# Patient Record
Sex: MALE | Race: WHITE
[De-identification: names, ages, dates, MRNs, and addresses within clinical notes are randomized per-mention and may not be internally consistent; named-entity substitution may affect disease eponyms.]

---

## 2018-04-07 ENCOUNTER — HOSPITAL ENCOUNTER (INPATIENT)
Dept: HOSPITAL 63 - ER | Age: 18
LOS: 1 days | Discharge: HOME | DRG: 897 | End: 2018-04-08
Attending: INTERNAL MEDICINE | Admitting: INTERNAL MEDICINE
Payer: SELF-PAY

## 2018-04-07 VITALS — HEIGHT: 73 IN | WEIGHT: 151.06 LBS | BODY MASS INDEX: 20.02 KG/M2

## 2018-04-07 VITALS — DIASTOLIC BLOOD PRESSURE: 54 MMHG | SYSTOLIC BLOOD PRESSURE: 90 MMHG

## 2018-04-07 VITALS — SYSTOLIC BLOOD PRESSURE: 108 MMHG | DIASTOLIC BLOOD PRESSURE: 69 MMHG

## 2018-04-07 DIAGNOSIS — Z79.899: ICD-10-CM

## 2018-04-07 DIAGNOSIS — F14.10: ICD-10-CM

## 2018-04-07 DIAGNOSIS — E87.2: ICD-10-CM

## 2018-04-07 DIAGNOSIS — F12.10: Primary | ICD-10-CM

## 2018-04-07 DIAGNOSIS — D72.828: ICD-10-CM

## 2018-04-07 DIAGNOSIS — N30.00: ICD-10-CM

## 2018-04-07 DIAGNOSIS — F32.9: ICD-10-CM

## 2018-04-07 DIAGNOSIS — F17.210: ICD-10-CM

## 2018-04-07 DIAGNOSIS — J45.909: ICD-10-CM

## 2018-04-07 DIAGNOSIS — E86.0: ICD-10-CM

## 2018-04-07 DIAGNOSIS — N30.20: ICD-10-CM

## 2018-04-07 LAB
% ATYL: 1 % (ref 0–0)
% LYMPHS: 9 % (ref 24–48)
% MONOS: 8 % (ref 0–10)
% SEGS: 81 % (ref 35–66)
ACETAMIN: < 2 MCG/ML (ref 10–30)
ALBUMIN SERPL-MCNC: 4.9 G/DL (ref 3.4–5)
ALP SERPL-CCNC: 120 U/L (ref 46–116)
ALT SERPL-CCNC: 31 U/L (ref 16–63)
AMORPH SED URNS QL MICRO: PRESENT /HPF
AMPHETAMINE/METHAMPHETAMINE: (no result)
ANION GAP SERPL CALC-SCNC: 16 MMOL/L (ref 6–14)
APTT PPP: YELLOW S
AST SERPL-CCNC: 33 U/L (ref 15–37)
BACTERIA #/AREA URNS HPF: 0 /HPF
BARBITURATES UR-MCNC: (no result) UG/ML
BASOPHILS # BLD AUTO: 0.1 X10^3/UL (ref 0–0.2)
BASOPHILS NFR BLD AUTO: 1 % (ref 0–3)
BASOPHILS NFR BLD: 0 % (ref 0–3)
BENZODIAZ UR-MCNC: (no result) UG/L
BGAS PCO2: 33 MMHG (ref 35–46)
BGAS PH: 7.42 (ref 7.35–7.46)
BGAS PO2: 76 MMHG (ref 80–100)
BILIRUB DIRECT SERPL-MCNC: 0.1 MG/DL (ref 0–0.2)
BILIRUB SERPL-MCNC: 0.5 MG/DL (ref 0.2–1)
BILIRUB UR QL STRIP: (no result)
BUN ISTAT: 13 MG/DL (ref 8–26)
CA-I SERPL ISE-MCNC: 13 MG/DL (ref 8–26)
CALCIUM SERPL-MCNC: 9.9 MG/DL (ref 8.5–10.1)
CANNABINOIDS UR-MCNC: (no result) UG/L
CHLORIDE SERPL-SCNC: 101 MMOL/L (ref 98–107)
CO2 SERPL-SCNC: 23 MMOL/L (ref 21–32)
COCAINE UR-MCNC: (no result) NG/ML
CREAT SERPL-MCNC: 1 MG/DL (ref 0.7–1.3)
DELTA BASE BGAS: -3 MMOL/L (ref 0–3)
EOSINOPHIL NFR BLD: 0 % (ref 0–3)
EOSINOPHIL NFR BLD: 0.1 X10^3/UL (ref 0–0.7)
ERYTHROCYTE [DISTWIDTH] IN BLOOD BY AUTOMATED COUNT: 13.4 % (ref 11.5–14.5)
ETHANOL SERPL-MCNC: < 10 MG/DL (ref 0–10)
FIBRINOGEN PPP-MCNC: (no result) MG/DL
GFR SERPLBLD BASED ON 1.73 SQ M-ARVRAT: 96.3 ML/MIN
GLUCOSE BLD-MCNC: 125 MG/DL (ref 60–99)
GLUCOSE SERPL-MCNC: 99 MG/DL (ref 70–99)
GLUCOSE UR STRIP-MCNC: (no result) MG/DL
HCT VFR BLD AUTO: 50 %
HCT VFR BLD CALC: 52.7 % (ref 39–53)
HEMOGLOBIN ISTAT: 17 GM/DL
HGB BLD-MCNC: 18 G/DL (ref 13–17.5)
LIPASE: 110 U/L (ref 73–393)
LYMPHOCYTES # BLD: 2.3 X10^3/UL (ref 1–4.8)
LYMPHOCYTES NFR BLD AUTO: 10 % (ref 24–48)
MAGNESIUM SERPL-MCNC: 2.2 MG/DL (ref 1.8–2.4)
MCH RBC QN AUTO: 31 PG (ref 25–35)
MCHC RBC AUTO-ENTMCNC: 34 G/DL (ref 31–37)
MCV RBC AUTO: 90 FL (ref 79–100)
METHADONE SERPL-MCNC: (no result) NG/ML
MONO #: 1.6 X10^3/UL (ref 0–1.1)
MONOCYTES NFR BLD: 7 % (ref 0–9)
NEUT #: 18.6 X10^3UL (ref 1.8–7.7)
NEUTROPHILS NFR BLD AUTO: 82 % (ref 31–73)
NITRITE UR QL STRIP: (no result)
O2 SAT BGAS: 96 % (ref 92–99)
O2/TOTAL GAS SETTING VFR VENT: 21 %
OPIATES UR-MCNC: (no result) NG/ML
PCP SERPL-MCNC: (no result) MG/DL
PLATELET # BLD AUTO: 301 X10^3/UL (ref 140–400)
PLATELET # BLD EST: ADEQUATE 10*3/UL
POTASSIUM BLD-SCNC: 3.4 MMOL/L (ref 3.5–5)
POTASSIUM SERPL-SCNC: 4 MMOL/L (ref 3.5–5.1)
PROT SERPL-MCNC: 8.9 G/DL (ref 6.4–8.2)
RBC # BLD AUTO: 5.89 X10^6/UL (ref 4.3–5.7)
RBC #/AREA URNS HPF: (no result) /HPF (ref 0–2)
SALIC: 4.4 MG/DL (ref 2.8–20)
SODIUM SERPL-SCNC: 139 MMOL/L (ref 135–145)
SODIUM SERPL-SCNC: 140 MMOL/L (ref 136–145)
SP GR UR STRIP: 1.01
SQUAMOUS #/AREA URNS LPF: (no result) /LPF
UROBILINOGEN UR-MCNC: 1 MG/DL
WBC # BLD AUTO: 22.6 X10^3/UL (ref 4–11)
WBC #/AREA URNS HPF: (no result) /HPF (ref 0–4)

## 2018-04-07 PROCEDURE — 87591 N.GONORRHOEAE DNA AMP PROB: CPT

## 2018-04-07 PROCEDURE — 74177 CT ABD & PELVIS W/CONTRAST: CPT

## 2018-04-07 PROCEDURE — 85007 BL SMEAR W/DIFF WBC COUNT: CPT

## 2018-04-07 PROCEDURE — 96361 HYDRATE IV INFUSION ADD-ON: CPT

## 2018-04-07 PROCEDURE — 80053 COMPREHEN METABOLIC PANEL: CPT

## 2018-04-07 PROCEDURE — 82803 BLOOD GASES ANY COMBINATION: CPT

## 2018-04-07 PROCEDURE — G0480 DRUG TEST DEF 1-7 CLASSES: HCPCS

## 2018-04-07 PROCEDURE — 82553 CREATINE MB FRACTION: CPT

## 2018-04-07 PROCEDURE — 80076 HEPATIC FUNCTION PANEL: CPT

## 2018-04-07 PROCEDURE — 87491 CHLMYD TRACH DNA AMP PROBE: CPT

## 2018-04-07 PROCEDURE — G0479 DRUG TEST PRESUMP NOT OPT: HCPCS

## 2018-04-07 PROCEDURE — 85610 PROTHROMBIN TIME: CPT

## 2018-04-07 PROCEDURE — 96374 THER/PROPH/DIAG INJ IV PUSH: CPT

## 2018-04-07 PROCEDURE — 87040 BLOOD CULTURE FOR BACTERIA: CPT

## 2018-04-07 PROCEDURE — 96375 TX/PRO/DX INJ NEW DRUG ADDON: CPT

## 2018-04-07 PROCEDURE — 87086 URINE CULTURE/COLONY COUNT: CPT

## 2018-04-07 PROCEDURE — 85027 COMPLETE CBC AUTOMATED: CPT

## 2018-04-07 PROCEDURE — 82550 ASSAY OF CK (CPK): CPT

## 2018-04-07 PROCEDURE — 83735 ASSAY OF MAGNESIUM: CPT

## 2018-04-07 PROCEDURE — 80307 DRUG TEST PRSMV CHEM ANLYZR: CPT

## 2018-04-07 PROCEDURE — 80047 BASIC METABLC PNL IONIZED CA: CPT

## 2018-04-07 PROCEDURE — 36415 COLL VENOUS BLD VENIPUNCTURE: CPT

## 2018-04-07 PROCEDURE — 81001 URINALYSIS AUTO W/SCOPE: CPT

## 2018-04-07 PROCEDURE — 85025 COMPLETE CBC W/AUTO DIFF WBC: CPT

## 2018-04-07 PROCEDURE — 80048 BASIC METABOLIC PNL TOTAL CA: CPT

## 2018-04-07 PROCEDURE — 83605 ASSAY OF LACTIC ACID: CPT

## 2018-04-07 PROCEDURE — 85730 THROMBOPLASTIN TIME PARTIAL: CPT

## 2018-04-07 PROCEDURE — 83690 ASSAY OF LIPASE: CPT

## 2018-04-07 RX ADMIN — SODIUM CHLORIDE SCH MLS/HR: 0.9 INJECTION, SOLUTION INTRAVENOUS at 18:45

## 2018-04-07 NOTE — PHYS DOC
Adult General


Chief Complaint


Chief Complaint:  NAUSEA/VOMITING/DIARRHEA





HPI


HPI





Patient is a 17 year old  male who presents with nausea vomiting and 

periumbilical abdominal pain. He states all his symptoms started about 45 

minutes after he took a herbal supplement. States he never took this before. He 

states he's vomited 10-15 times. He denies any blood in his vomit. He has not 

had a bowel movement today. He states he's had one yesterday that was normal. 

He has no history of surgeries. He does have a history of depression and takes 

Prozac. He denies any alcohol abuse smoking or illicit drug abuse. He states 

the pain is constant is around his umbilicus and does not radiate. His been 

constant for the last 2-3 hours.





Review of Systems


Review of Systems





Constitutional: Denies fever or chills []


Eyes: Denies change in visual acuity, redness, or eye pain []


HENT: Denies nasal congestion or sore throat []


Respiratory: Denies cough or shortness of breath []


Cardiovascular: No additional information not addressed in HPI []


GI:  Positive for abdominal pain, nausea, vomiting, Denies bloody stools or 

diarrhea []


: Denies dysuria or hematuria []


Musculoskeletal: Denies back pain or joint pain []


Integument: Denies rash or skin lesions []


Neurologic: Denies headache, focal weakness or sensory changes []


Endocrine: Denies polyuria or polydipsia []





All other systems were reviewed and found to be within normal limits, except as 

documented in this note.





Allergies


Allergies





Allergies








Coded Allergies Type Severity Reaction Last Updated Verified


 


  No Known Drug Allergies    4/7/18 No











Physical Exam


Physical Exam





Constitutional: Well developed, well nourished, no acute distress, non-toxic 

appearance. []


HENT: Normocephalic, atraumatic, bilateral external ears normal, oropharynx 

moist, no oral exudates, nose normal. []


Eyes: PERRLA, EOMI, conjunctiva normal, no discharge. [] 


Neck: Normal range of motion, no tenderness, supple, no stridor. [] 


Cardiovascular:Heart rate regular rhythm, no murmur []


Lungs & Thorax:  Bilateral breath sounds clear to auscultation []


Abdomen/genital: Bowel sounds normal, soft,mild tender to palpation around the 

umbilicus, no guarding or rebound, no masses, no pulsatile masses. Testes 

nontender bilaterally, no masses appreciated


Skin: Warm, dry, no erythema, no rash. [] 


Back: No tenderness, no CVA tenderness. [] 


Extremities: No tenderness, no cyanosis, no clubbing, ROM intact, no edema. [] 


Neurologic: Alert and oriented X 3, normal motor function, normal sensory 

function, no focal deficits noted. []


Psychologic: Affect normal, judgement normal, mood normal. []





EKG


EKG


[]





Radiology/Procedures


Radiology/Procedures


CT abd/pelvis: 





IMPRESSION:


1. No acute abnormality of abdomen or pelvis. Normal appendix.


2. Possible mild wall thickening of the ascending colon. Consider mild 


infectious or inflammatory colitis.


3. Mild wall thickening of the urinary bladder. Acute versus chronic 


cystitis.  


 []


Impressions:


Abdominal pain


Cocaine positive


Lactic acidosis





Course & Med Decision Making


Course & Med Decision Making


Pertinent Labs and Imaging studies reviewed. (See chart for details)





Labs pending, CT abdomen and pelvis pending. Patient does have a lactic acid 

and cocaine positive. He is received IV fluids. ABG is pending at this time. 

Spoke with him regarding his marijuana and cocaine use and he stated he used 

last night. He does not want his parents to know. I informed him that he will 

likely need to be admitted for serial abdominal exams of his CT scan doesn't 

show anything acute. He is being checked out to Dr. Hebert, for final 

disposition








1800: assumed care of pt.  Added some labs, ordered IV zosyn and blood 

cultures.  Pt reports his pain has not returned and his VSS with slightly 

elevated BP. 


1830: CT without acute findings.  Pt still pain free.  Pt accepted to Dr. Quinteros 

for overnight, ongoing monitoring of symptoms, fluids, repeat lactates.





Dragon Disclaimer


Dragon Disclaimer


This electronic medical record was generated, in whole or in part, using a 

voice recognition dictation system.





Departure


Departure:


Impression:  


 Primary Impression:  


 Lactic acid acidosis


 Additional Impressions:  


 Leukocytosis


 Abdominal pain


Disposition:  09 ADMITTED AS INPATIENT


Admitting Physician:  Lewis Quinteros


Condition:  IMPROVED





Problem Qualifiers











ANGELINA ALBERTO MD Apr 7, 2018 15:19


KALLI KHAN MD Apr 7, 2018 18:17

## 2018-04-07 NOTE — NUR
The patient, GURPREET WATTERS, 18 y/o, M admitted by JAMES CANO MD, was given written 
information regarding hospital policies, unit procedures and contact persons. Admission 
assessment performed and antibiotics started. Patient comfortable, will continue to monitor. 
 



Valuables were checked and recorded.

## 2018-04-07 NOTE — RAD
CT ABD PELV W/ORAL IV CONTRAST dated 4/7/2018 5:55 PM

 

Indication: Pain.Adb pain mid section x 1 day, po Omni 240 30ml and Omni 

300 75ml was given.

 

Comparison: No comparison is available.

 

Technique: Contiguous axial imaging of the abdomen and pelvis performed 

after the administration of 75 cc Omnipaque 300.

One or more of the following individualized dose reduction techniques were

utilized for this examination:  1. Automated exposure control  2. 

Adjustment of the mA and/or kV according to patient size  3. Use of 

iterative reconstruction technique

 

Findings: 

Limited images of lung bases are clear. Heart size within normal limits. 

No pleural or pericardial effusion.

 

Liver, spleen, pancreas, adrenal glands, gallbladder and kidneys are 

unremarkable. No hydronephrosis.

 

Partially opacified GI tract normal in caliber and contour. No focal bowel

wall thickening. No inflammatory stranding in the mesentery. The appendix 

is normal in caliber. No ascites or lymphadenopathy. Abdominal aorta 

normal in caliber. There is possible mild wall thickening of the ascending

colon

 

Images of pelvis shows nondistended urinary bladder. There is a suggestion

of mild bladder wall thickening. Prostate gland normal in size. No 

significant free pelvic fluid or pelvic lymphadenopathy.

 

Bone windows show no acute findings. 

 

IMPRESSION:

1. No acute abnormality of abdomen or pelvis. Normal appendix.

2. Possible mild wall thickening of the ascending colon. Consider mild 

infectious or inflammatory colitis.

3. Mild wall thickening of the urinary bladder. Acute versus chronic 

cystitis.  

 

Electronically signed by: Tre Galvan MD (4/7/2018 6:11 PM) 

[facility]

## 2018-04-08 VITALS — SYSTOLIC BLOOD PRESSURE: 96 MMHG | DIASTOLIC BLOOD PRESSURE: 57 MMHG

## 2018-04-08 VITALS — SYSTOLIC BLOOD PRESSURE: 105 MMHG | DIASTOLIC BLOOD PRESSURE: 63 MMHG

## 2018-04-08 LAB
ALBUMIN SERPL-MCNC: 3.5 G/DL (ref 3.4–5)
ALBUMIN/GLOB SERPL: 1.1 {RATIO} (ref 1–1.7)
ALP SERPL-CCNC: 82 U/L (ref 46–116)
ALT SERPL-CCNC: 23 U/L (ref 16–63)
ANION GAP SERPL CALC-SCNC: 8 MMOL/L (ref 6–14)
AST SERPL-CCNC: 20 U/L (ref 15–37)
BILIRUB SERPL-MCNC: 0.4 MG/DL (ref 0.2–1)
BUN/CREAT SERPL: 9 (ref 6–20)
CA-I SERPL ISE-MCNC: 7 MG/DL (ref 8–26)
CALCIUM SERPL-MCNC: 8.9 MG/DL (ref 8.5–10.1)
CHLORIDE SERPL-SCNC: 107 MMOL/L (ref 98–107)
CO2 SERPL-SCNC: 26 MMOL/L (ref 21–32)
CREAT SERPL-MCNC: 0.8 MG/DL (ref 0.7–1.3)
ERYTHROCYTE [DISTWIDTH] IN BLOOD BY AUTOMATED COUNT: 13.2 % (ref 11.5–14.5)
GFR SERPLBLD BASED ON 1.73 SQ M-ARVRAT: 124.5 ML/MIN
GLOBULIN SER-MCNC: 3.1 G/DL (ref 2.2–3.8)
GLUCOSE SERPL-MCNC: 88 MG/DL (ref 70–99)
HCT VFR BLD CALC: 43.1 % (ref 39–53)
HGB BLD-MCNC: 14.9 G/DL (ref 13–17.5)
MCH RBC QN AUTO: 31 PG (ref 25–35)
MCHC RBC AUTO-ENTMCNC: 35 G/DL (ref 31–37)
MCV RBC AUTO: 89 FL (ref 79–100)
PLATELET # BLD AUTO: 235 X10^3/UL (ref 140–400)
POTASSIUM SERPL-SCNC: 3.6 MMOL/L (ref 3.5–5.1)
PROT SERPL-MCNC: 6.6 G/DL (ref 6.4–8.2)
RBC # BLD AUTO: 4.84 X10^6/UL (ref 4.3–5.7)
SODIUM SERPL-SCNC: 141 MMOL/L (ref 136–145)
WBC # BLD AUTO: 6.9 X10^3/UL (ref 4–11)

## 2018-04-08 RX ADMIN — SODIUM CHLORIDE SCH MLS/HR: 0.9 INJECTION, SOLUTION INTRAVENOUS at 04:48

## 2018-04-08 NOTE — SSS
ADMIT DATE:  



HISTORY OF PRESENT ILLNESS:  The patient is a 17-year-old  male patient

who was admitted through the Emergency Department with a complaint of recurrent

bouts of nausea, vomiting and periumbilical abdominal pain.  All his symptoms

started about 45 minutes after he took herbal supplement.  States he never took

this before.  He states that he vomited about 10-15 times.  He denied any blood

in his vomitus.  He has not had a bowel movement the day of admission; he states

had one day before, it was normal.  He has no history of surgeries.  He does

have history of depression and takes Prozac.  He denied any alcohol abuse,

smoking, or illicit drug abuse.  His pain is mostly around his umbilicus and

does not radiate and apparently has been constant for almost 3 hours prior to

admission to the hospital.  He was basically extensively investigated in the

Emergency Room, was found to have multiple lab anomalies including marked

leukocytosis with a white cell count of 22,600, was also extremely dehydrated,

and with hemoconcentration, his hemoglobin was 18, hematocrit 52.7; however, his

platelet counts were normal.  His blood gases were normal.  However, his

chemistry showed that he has a lactic acidosis with a lactic acid of 5.2,

slightly elevated CK, and it transpired that on his toxic screen, it was

positive for cocaine and cannabinoids.  The patient was admitted, continued on

IV fluid.  When I saw him today, he was sitting slightly propped up in bed, no

apparent distress, has no further nausea and vomiting.  No abdominal pain.  He

has been tolerating his food without difficulty.  We did repeat his labs this

morning showed that everything has normalized.  His serum sodium was 141,

potassium 3.6, BUN 7, creatinine 0.8.  White cell count came down to 6900.



PAST MEDICAL HISTORY:  Significant for bronchial asthma and depression.



PAST SURGICAL HISTORY:  Bilateral myringotomy.



ALLERGIES:  He has no known drug allergies.



MEDICATIONS:  He has Seroquel 50 mg at bedtime and Prozac 20 mg daily.



FAMILY HISTORY:  He has 1 younger brother and 3 older sisters, all healthy.  His

father is alive at age 55 and mother alive at the age of 53.



SOCIAL HISTORY:  He is single, has no children.  He smoked a pack of cigarettes

in 3 days.  He does not drink alcohol.  Apparently, he has been using marijuana

and cocaine.  He is like a  somewhere in [place].



PHYSICAL EXAMINATION:

GENERAL:  When I examined him, he looked well and was clearly in no apparent

respiratory distress, pale.  Not jaundiced, cyanosis, or thyromegaly.  No

jugular venous distension.  No limb edema.

VITAL SIGNS:  His heart rate was 71, blood pressure 105/63, temperature was

98.3, respiratory rate was 18 and oxygen saturation was 98%.

HEAD, EYES, EARS, NOSE AND THROAT:  Showed normocephalic, atraumatic.

NECK:  Supple.

HEART:  Showed normal first and second heart sounds with no gallop or murmur.

CHEST:  Clear to auscultation.  No crepitation or rhonchi.

ABDOMEN:  Scaphoid, soft, nontender.  No guarding or rigidity.  No organomegaly.

 All hernial orifice intact.  Bowel sounds normal.

NEUROLOGIC:  He was awake, alert, responding appropriately.  All cranial nerves

intact.

EXTREMITIES:  He moves extremities without difficulty.  He ambulates without

assistance or assistive devices.



LABORATORY DATA:  His lab work this morning showed a white cell count 6900 from

22,600, hemoglobin 14 down from 18 and hematocrit 43 from 53, platelets are

normal.  His chemistry has normalized as well.  His lactic acid was only 0.8.



ASSESSMENT AND PLAN:  The patient was discharged home to continue on his Prozac

20 mg once a day and Seroquel 50 mg at bedtime.



FINAL DISCHARGE DIAGNOSES:  Cocaine and marijuana abuse with marked lactic

acidosis, probably reactive leukocytosis.  His CT scan showed no acute

abnormality of the abdomen and pelvis, normal appendix, possible mild wall

thickening of the ascending colon and mild wall thickening with air in the

bladder, acute versus chronic cystitis.





______________________________

JAMES CANO MD



DR:  CHELSEA/joelle  JOB#:  [id number] / [id number]

DD:  04/08/2018 16:07  DT:  04/08/2018 19:36

## 2018-04-08 NOTE — NUR
NSG NOTE; DISCHARGE



VERBAL AND WRITTEN DISCHARGE INSTRUCTIONS GIVEN TO PT WITH VERBAL UNDERSTANDING,



DISCHARGE TO HOME AT 1518 VIA AMB ACCOMP BY PARENTS

## 2020-11-12 ENCOUNTER — HOSPITAL ENCOUNTER (EMERGENCY)
Dept: HOSPITAL 63 - ER | Age: 20
Discharge: HOME | End: 2020-11-12
Payer: SELF-PAY

## 2020-11-12 VITALS — BODY MASS INDEX: 23.93 KG/M2 | HEIGHT: 73 IN | WEIGHT: 180.56 LBS

## 2020-11-12 VITALS — DIASTOLIC BLOOD PRESSURE: 78 MMHG | SYSTOLIC BLOOD PRESSURE: 131 MMHG

## 2020-11-12 DIAGNOSIS — F14.90: ICD-10-CM

## 2020-11-12 DIAGNOSIS — R19.7: ICD-10-CM

## 2020-11-12 DIAGNOSIS — F12.90: ICD-10-CM

## 2020-11-12 DIAGNOSIS — F41.9: ICD-10-CM

## 2020-11-12 DIAGNOSIS — F32.9: ICD-10-CM

## 2020-11-12 DIAGNOSIS — R11.2: Primary | ICD-10-CM

## 2020-11-12 DIAGNOSIS — R05: ICD-10-CM

## 2020-11-12 LAB
ANION GAP SERPL CALC-SCNC: 13 MMOL/L (ref 6–14)
BASOPHILS # BLD AUTO: 0.1 X10^3/UL (ref 0–0.2)
BASOPHILS NFR BLD: 1 % (ref 0–3)
CA-I SERPL ISE-MCNC: 13 MG/DL (ref 8–26)
CALCIUM SERPL-MCNC: 9.3 MG/DL (ref 8.5–10.1)
CHLORIDE SERPL-SCNC: 103 MMOL/L (ref 98–107)
CO2 SERPL-SCNC: 24 MMOL/L (ref 21–32)
CREAT SERPL-MCNC: 0.9 MG/DL (ref 0.7–1.3)
EOSINOPHIL NFR BLD: 0.1 X10^3/UL (ref 0–0.7)
EOSINOPHIL NFR BLD: 1 % (ref 0–3)
ERYTHROCYTE [DISTWIDTH] IN BLOOD BY AUTOMATED COUNT: 13.5 % (ref 11.5–14.5)
GFR SERPLBLD BASED ON 1.73 SQ M-ARVRAT: 105.5 ML/MIN
GLUCOSE SERPL-MCNC: 83 MG/DL (ref 70–99)
HCT VFR BLD CALC: 49.9 % (ref 39–53)
HGB BLD-MCNC: 16.6 G/DL (ref 13–17.5)
LYMPHOCYTES # BLD: 2.4 X10^3/UL (ref 1–4.8)
LYMPHOCYTES NFR BLD AUTO: 32 % (ref 24–48)
MCH RBC QN AUTO: 30 PG (ref 25–35)
MCHC RBC AUTO-ENTMCNC: 33 G/DL (ref 31–37)
MCV RBC AUTO: 89 FL (ref 79–100)
MONO #: 0.7 X10^3/UL (ref 0–1.1)
MONOCYTES NFR BLD: 9 % (ref 0–9)
NEUT #: 4.4 X10^3UL (ref 1.8–7.7)
NEUTROPHILS NFR BLD AUTO: 58 % (ref 31–73)
PLATELET # BLD AUTO: 298 X10^3/UL (ref 140–400)
POTASSIUM SERPL-SCNC: 4.1 MMOL/L (ref 3.5–5.1)
RBC # BLD AUTO: 5.59 X10^6/UL (ref 4.3–5.7)
SODIUM SERPL-SCNC: 140 MMOL/L (ref 136–145)
WBC # BLD AUTO: 7.6 X10^3/UL (ref 4–11)

## 2020-11-12 PROCEDURE — 71045 X-RAY EXAM CHEST 1 VIEW: CPT

## 2020-11-12 PROCEDURE — 80048 BASIC METABOLIC PNL TOTAL CA: CPT

## 2020-11-12 PROCEDURE — 36415 COLL VENOUS BLD VENIPUNCTURE: CPT

## 2020-11-12 PROCEDURE — 85025 COMPLETE CBC W/AUTO DIFF WBC: CPT

## 2020-11-12 PROCEDURE — 99284 EMERGENCY DEPT VISIT MOD MDM: CPT

## 2020-11-12 NOTE — RAD
Exam: Chest one view

 

INDICATION: Cough

 

TECHNIQUE: Frontal view of the chest

 

Comparisons: None

 

FINDINGS:

The cardiomediastinal silhouette and pulmonary vessels are within normal 

limits.

 

The lung and pleural spaces are clear.

 

IMPRESSION:

No acute cardiopulmonary process.

 

Electronically signed by: Vick Salmeron MD (11/12/2020 7:15 PM) [facility]

## 2020-11-12 NOTE — PHYS DOC
Past History


Past Medical History:  Anxiety, Depression


 (LANCE GARCIA)


Past Surgical History:  No Surgical History


 (LANCE GARCIA)


Alcohol Use:  None


Drug Use:  Cocaine, Marijuana


 (LANCE GARCIA)





General Adult


EDM:


Chief Complaint:  MULTIPLE COMPLAINTS





HPI:


HPI:





Patient is a 20-year-old male who presents to the emergency room with concerns 

of frequent nausea, vomiting, and a dry cough for the last month.  Patient 

denies any hematemesis, indigestion, abdominal pain, chest pain, palpitations, 

shortness of breath, sore throat, body aches, fatigue, or headache.  Patient 

also reports having intermittent diarrhea, he denies any diarrhea today.  He d

enies any blood in his stools. He reports that he has been under a great amount 

of stress recently he had difficulty finding a job and is taking care of his 

mother.  Patient currently denies any pain.  He reports that he needs a note 

saying that he can go back to work.  He denies any suicidal or homicidal 

ideations


 (LANCE GARCIA)





Review of Systems:


Review of Systems:


Constitutional:  Denies fever or chills 


Eyes:  Denies change in visual acuity 


HENT:  Denies nasal congestion or sore throat 


Respiratory:  Denies cough or shortness of breath 


Cardiovascular:  Denies chest pain or edema 


GI:  Denies abdominal pain, nausea, vomiting, bloody stools or diarrhea 


: Denies dysuria 


Musculoskeletal:  Denies back pain or joint pain 


Integument:  Denies rash 


Neurologic:  Denies headache, focal weakness or sensory changes 


Endocrine:  Denies polyuria or polydipsia 


Lymphatic:  Denies swollen glands 


Psychiatric:  Denies depression or anxiety


 (LANCE GARCIA)





Allergies:


Allergies:





Allergies








Coded Allergies Type Severity Reaction Last Updated Verified


 


  No Known Drug Allergies    11/12/20 No








 (LANCE GARCIA)





Physical Exam:


PE:





Constitutional: Well developed, well nourished, no acute distress, non-toxic 

appearance. []


HENT: Normocephalic, atraumatic, bilateral external ears normal, mucous 

membranes moist, oropharynx normal, nose normal. []


Eyes: PERRLA, EOMI, conjunctiva normal, no discharge. [] 


Neck: Normal range of motion, no stridor. [] 


Cardiovascular:Heart rate regular rhythm


Lungs & Thorax:  Respirations even and unlabored, no retractions, no respiratory

 distress


Abdomen: soft, no tenderness, no palpable masses


Skin: Warm, dry, no erythema, no rash, well hydrated, no tenting. [] 


Extremities: No cyanosis, ROM intact, no edema. [] 


Neurologic: Alert and oriented X 3, no focal deficits noted. []


Psychologic: Affect normal, judgement normal, mood anxious


 (LANCE GARCIA)





Current Patient Data:


Labs:





                                Laboratory Tests








Test


 11/12/20


18:37


 


White Blood Count


 7.6 x10^3/uL


(4.0-11.0)


 


Red Blood Count


 5.59 x10^6/uL


(4.30-5.70)


 


Hemoglobin


 16.6 g/dL


(13.0-17.5)


 


Hematocrit


 49.9 %


(39.0-53.0)


 


Mean Corpuscular Volume


 89 fL ()





 


Mean Corpuscular Hemoglobin 30 pg (25-35)  


 


Mean Corpuscular Hemoglobin


Concent 33 g/dL


(31-37)


 


Red Cell Distribution Width


 13.5 %


(11.5-14.5)


 


Platelet Count


 298 x10^3/uL


(140-400)


 


Neutrophils (%) (Auto) 58 % (31-73)  


 


Lymphocytes (%) (Auto) 32 % (24-48)  


 


Monocytes (%) (Auto) 9 % (0-9)  


 


Eosinophils (%) (Auto) 1 % (0-3)  


 


Basophils (%) (Auto) 1 % (0-3)  


 


Neutrophils # (Auto)


 4.4 x10^3uL


(1.8-7.7)


 


Lymphocytes # (Auto)


 2.4 x10^3/uL


(1.0-4.8)


 


Monocytes # (Auto)


 0.7 x10^3/uL


(0.0-1.1)


 


Eosinophils # (Auto)


 0.1 x10^3/uL


(0.0-0.7)


 


Basophils # (Auto)


 0.1 x10^3/uL


(0.0-0.2)


 


Sodium Level


 140 mmol/L


(136-145)


 


Potassium Level


 4.1 mmol/L


(3.5-5.1)


 


Chloride Level


 103 mmol/L


()


 


Carbon Dioxide Level


 24 mmol/L


(21-32)


 


Anion Gap 13 (6-14)  


 


Blood Urea Nitrogen


 13 mg/dL


(8-26)


 


Creatinine


 0.9 mg/dL


(0.7-1.3)


 


Estimated GFR


(Cockcroft-Gault) 105.5  





 


Glucose Level


 83 mg/dL


(70-99)


 


Calcium Level


 9.3 mg/dL


(8.5-10.1)








Vital Signs:





                                   Vital Signs








  Date Time  Temp Pulse Resp B/P (MAP) Pulse Ox O2 Delivery O2 Flow Rate FiO2


 


11/12/20 18:39  83 18 131/78 (95) 98 Room Air  


 


11/12/20 16:15 98.0       








 (LANCE GARCIA)





EKG:


EKG:


[]


 (LANCE GARCIA)





Radiology/Procedures:


Radiology/Procedures:


PROCEDURE: CHEST AP ONLY





Exam: Chest one view


 


INDICATION: Cough


 


TECHNIQUE: Frontal view of the chest


 


Comparisons: None


 


FINDINGS:


The cardiomediastinal silhouette and pulmonary vessels are within normal 


limits.


 


The lung and pleural spaces are clear.


 


IMPRESSION:


No acute cardiopulmonary process.


 []


 (LANCE GARCIA)





Heart Score:


Risk Factors:


Risk Factors:  DM, Current or recent (<one month) smoker, HTN, HLP, family 

history of CAD, obesity.


Risk Scores:


Score 0 - 3:  2.5% MACE over next 6 weeks - Discharge Home


Score 4 - 6:  20.3% MACE over next 6 weeks - Admit for Clinical Observation


Score 7 - 10:  72.7% MACE over next 6 weeks - Early Invasive Strategies


 (LANCE GARCIA)





Course & Med Decision Making:


Course & Med Decision Making


Pertinent Labs and Imaging studies reviewed. (See chart for details)


20-year-old male presents emergency room with complaints of nausea, vomiting, 

and dry cough for the last month.  He also reported having intermittent bouts of

 diarrhea for the last month.


Chest x-ray revealed no acute findings.  CBC BMP were completely unremarkable.  

The patient seemed to be suffering from a lot of stress and anxiety in his life.

  I encouraged him to follow-up with a primary care doctor or maybe seek 

behavioral health therapy for management of his anxiety and depression.  I also 

encouraged him to stop smoking marijuana as the marijuana may be what is causing

 his vomiting.





Patient verbalized an understanding of home care, medications, follow-up, and 

return to ED instructions and was in agreement with the plan of care.


[]


 (LANCE GARCIA)





Tamera Disclaimer:


Dragon Disclaimer:


This electronic medical record was generated, in whole or in part, using a voice

 recognition dictation system.


 (LANCE GARCIA)


Attending Co-Sign


The patient was seen and interviewed as well as examined at the bedside. The 

chart was reviewed. The case was discussed. Agree with the plan of care.


 (JOIE GRAY DO)





Departure


Departure:


Impression:  


   Primary Impression:  


   Nausea & vomiting


   Qualified Codes:  R11.2 - Nausea with vomiting, unspecified


   Additional Impression:  


   Anxiety


Disposition:  01 DC HOME SELF CARE/HOMELESS


Condition:  STABLE


Referrals:  


PCP,FALGUNI (PCP)


Patient Instructions:  Anxiety and Panic Attacks, Easy-to-Read, Nausea and 

Vomiting, Easy-to-Read





Additional Instructions:  


I recommend that you stop using marijuana.  Your labs and chest x-ray today were

 normal.  Follow-up with your primary care doctor for further evaluation and 

treatment of recurring vomiting.  Return to the ER if symptoms worsen.











LANCE GARCIA       Nov 12, 2020 19:23


JOIE GRAY DO                 Nov 12, 2020 23:15

## 2022-05-18 ENCOUNTER — HOSPITAL ENCOUNTER (EMERGENCY)
Dept: HOSPITAL 63 - ER | Age: 22
Discharge: HOME | End: 2022-05-18
Payer: COMMERCIAL

## 2022-05-18 VITALS — WEIGHT: 180.56 LBS | HEIGHT: 73 IN | BODY MASS INDEX: 23.93 KG/M2

## 2022-05-18 VITALS — DIASTOLIC BLOOD PRESSURE: 67 MMHG | SYSTOLIC BLOOD PRESSURE: 123 MMHG

## 2022-05-18 DIAGNOSIS — F41.9: ICD-10-CM

## 2022-05-18 DIAGNOSIS — U07.1: Primary | ICD-10-CM

## 2022-05-18 DIAGNOSIS — J45.909: ICD-10-CM

## 2022-05-18 DIAGNOSIS — M62.830: ICD-10-CM

## 2022-05-18 DIAGNOSIS — F32.9: ICD-10-CM

## 2022-05-18 LAB
ALBUMIN SERPL-MCNC: 4 G/DL (ref 3.4–5)
ALP SERPL-CCNC: 89 U/L (ref 46–116)
ALT SERPL-CCNC: 33 U/L (ref 16–63)
AMPHETAMINE/METHAMPHETAMINE: (no result)
ANION GAP SERPL CALC-SCNC: 12 MMOL/L (ref 6–14)
APTT PPP: YELLOW S
AST SERPL-CCNC: 19 U/L (ref 15–37)
BACTERIA #/AREA URNS HPF: 0 /HPF
BARBITURATES UR-MCNC: (no result) UG/ML
BASOPHILS # BLD AUTO: 0 X10^3/UL (ref 0–0.2)
BASOPHILS NFR BLD: 0 % (ref 0–3)
BENZODIAZ UR-MCNC: (no result) UG/L
BILIRUB DIRECT SERPL-MCNC: 0.1 MG/DL (ref 0–0.2)
BILIRUB SERPL-MCNC: 0.4 MG/DL (ref 0.2–1)
CA-I SERPL ISE-MCNC: 11 MG/DL (ref 8–26)
CALCIUM SERPL-MCNC: 9.3 MG/DL (ref 8.5–10.1)
CANNABINOIDS UR-MCNC: (no result) UG/L
CHLORIDE SERPL-SCNC: 105 MMOL/L (ref 98–107)
CO2 SERPL-SCNC: 23 MMOL/L (ref 21–32)
COCAINE UR-MCNC: (no result) NG/ML
CREAT SERPL-MCNC: 1 MG/DL (ref 0.7–1.3)
CRP SERPL-MCNC: 4.3 MG/L (ref 0–3.3)
EOSINOPHIL NFR BLD: 0 X10^3/UL (ref 0–0.7)
EOSINOPHIL NFR BLD: 1 % (ref 0–3)
ERYTHROCYTE [DISTWIDTH] IN BLOOD BY AUTOMATED COUNT: 14 % (ref 11.5–14.5)
FIBRINOGEN PPP-MCNC: CLEAR MG/DL
GFR SERPLBLD BASED ON 1.73 SQ M-ARVRAT: 92.6 ML/MIN
GLUCOSE SERPL-MCNC: 91 MG/DL (ref 70–99)
GLUCOSE UR STRIP-MCNC: (no result) MG/DL
HCT VFR BLD CALC: 45.7 % (ref 39–53)
HGB BLD-MCNC: 15.7 G/DL (ref 13–17.5)
INFLUENZA A PATIENT: NEGATIVE
INFLUENZA B PATIENT: NEGATIVE
LIPASE: 84 U/L (ref 73–393)
LYMPHOCYTES # BLD: 0.5 X10^3/UL (ref 1–4.8)
LYMPHOCYTES NFR BLD AUTO: 6 % (ref 24–48)
MAGNESIUM SERPL-MCNC: 1.9 MG/DL (ref 1.8–2.4)
MCH RBC QN AUTO: 31 PG (ref 25–35)
MCHC RBC AUTO-ENTMCNC: 34 G/DL (ref 31–37)
MCV RBC AUTO: 89 FL (ref 79–100)
METHADONE SERPL-MCNC: (no result) NG/ML
MONO #: 1.1 X10^3/UL (ref 0–1.1)
MONOCYTES NFR BLD: 12 % (ref 0–9)
NEUT #: 7.3 X10^3UL (ref 1.8–7.7)
NEUTROPHILS NFR BLD AUTO: 81 % (ref 31–73)
NITRITE UR QL STRIP: (no result)
OPIATES UR-MCNC: (no result) NG/ML
PCP SERPL-MCNC: (no result) MG/DL
PLATELET # BLD AUTO: 252 X10^3/UL (ref 140–400)
POTASSIUM SERPL-SCNC: 3.2 MMOL/L (ref 3.5–5.1)
PROT SERPL-MCNC: 7.3 G/DL (ref 6.4–8.2)
RBC # BLD AUTO: 5.11 X10^6/UL (ref 4.3–5.7)
RBC #/AREA URNS HPF: 0 /HPF (ref 0–2)
SODIUM SERPL-SCNC: 140 MMOL/L (ref 136–145)
SP GR UR STRIP: 1.02
SQUAMOUS #/AREA URNS LPF: (no result) /LPF
UROBILINOGEN UR-MCNC: 0.2 MG/DL
WBC # BLD AUTO: 9 X10^3/UL (ref 4–11)
WBC #/AREA URNS HPF: (no result) /HPF (ref 0–4)

## 2022-05-18 PROCEDURE — 85610 PROTHROMBIN TIME: CPT

## 2022-05-18 PROCEDURE — 83880 ASSAY OF NATRIURETIC PEPTIDE: CPT

## 2022-05-18 PROCEDURE — 82550 ASSAY OF CK (CPK): CPT

## 2022-05-18 PROCEDURE — 80048 BASIC METABOLIC PNL TOTAL CA: CPT

## 2022-05-18 PROCEDURE — 80076 HEPATIC FUNCTION PANEL: CPT

## 2022-05-18 PROCEDURE — 71045 X-RAY EXAM CHEST 1 VIEW: CPT

## 2022-05-18 PROCEDURE — 83690 ASSAY OF LIPASE: CPT

## 2022-05-18 PROCEDURE — 96374 THER/PROPH/DIAG INJ IV PUSH: CPT

## 2022-05-18 PROCEDURE — 36415 COLL VENOUS BLD VENIPUNCTURE: CPT

## 2022-05-18 PROCEDURE — 86140 C-REACTIVE PROTEIN: CPT

## 2022-05-18 PROCEDURE — 99285 EMERGENCY DEPT VISIT HI MDM: CPT

## 2022-05-18 PROCEDURE — 87428 SARSCOV & INF VIR A&B AG IA: CPT

## 2022-05-18 PROCEDURE — 81001 URINALYSIS AUTO W/SCOPE: CPT

## 2022-05-18 PROCEDURE — 85730 THROMBOPLASTIN TIME PARTIAL: CPT

## 2022-05-18 PROCEDURE — 96375 TX/PRO/DX INJ NEW DRUG ADDON: CPT

## 2022-05-18 PROCEDURE — 80307 DRUG TEST PRSMV CHEM ANLYZR: CPT

## 2022-05-18 PROCEDURE — 96361 HYDRATE IV INFUSION ADD-ON: CPT

## 2022-05-18 PROCEDURE — 84443 ASSAY THYROID STIM HORMONE: CPT

## 2022-05-18 PROCEDURE — 85025 COMPLETE CBC W/AUTO DIFF WBC: CPT

## 2022-05-18 PROCEDURE — 93005 ELECTROCARDIOGRAM TRACING: CPT

## 2022-05-18 PROCEDURE — 72128 CT CHEST SPINE W/O DYE: CPT

## 2022-05-18 PROCEDURE — 72131 CT LUMBAR SPINE W/O DYE: CPT

## 2022-05-18 PROCEDURE — 83735 ASSAY OF MAGNESIUM: CPT

## 2022-05-18 PROCEDURE — 84484 ASSAY OF TROPONIN QUANT: CPT

## 2022-05-18 PROCEDURE — 85379 FIBRIN DEGRADATION QUANT: CPT

## 2022-05-18 NOTE — RAD
CT thoracic spine without contrast. CT lumbar spine without contrast.



HISTORY: Chest and upper back pain and lower back pain.



PQRS statement: CT scans at this facility use dose reduction including either automated exposure cont
rol, iterative reconstructions, and /or weight based radiation dosing via mA and kV modification when
 appropriate to reduce radiation dose to as low as reasonably achievable.





CT thoracic spine findings: Thoracic vertebral body height and alignment. No fracture. No spondylolys
is defect. No bone lesion. Thoracic disc height loss and endplate irregularities from Schmorl's nodes
 are measured advanced changes of degenerative disc disease. There may be some scattered shallow disc
 bulges. Bony spinal canal grossly patent. Paraspinal tissues are normal.



IMPRESSION: No acute osseous injury of the thoracic spine. See above.







CT lumbar spine findings: Lumbar vertebral body height and alignment. No fracture. No spondylolysis d
efect. No bone lesion. Paraspinal tissues are normal. Posterior disc height loss at L5-S1. There may 
be shallow disc bulges at the lower lumbar spine. Bony spinal canal grossly patent.



IMPRESSION: No acute osseous injury of the lumbar spine. Mild changes of lumbar disc disease.



Electronically signed by: Kalia Pederson MD (5/18/2022 7:49 PM) [facility]

## 2022-05-18 NOTE — RAD
XR CHEST 1V



Clinical History: Reason: Chest and back pain / Spl. Instructions:  / History: 



Technique:  AP view of the chest was obtained at 5/18/2022 7:14 PM.



Comparison: November 12, 2020.



Findings:

The cardiomediastinal silhouette is normal. The pulmonary vasculature is normal. The lungs and pleura
l margins are clear.



Impression:  



No evidence of an acute cardiopulmonary process.



Electronically signed by: Sanjiv Dillon III, MD (5/18/2022 7:47 PM) [facility]

## 2022-05-18 NOTE — RAD
CT thoracic spine without contrast. CT lumbar spine without contrast.



HISTORY: Chest and upper back pain and lower back pain.



PQRS statement: CT scans at this facility use dose reduction including either automated exposure cont
rol, iterative reconstructions, and /or weight based radiation dosing via mA and kV modification when
 appropriate to reduce radiation dose to as low as reasonably achievable.





CT thoracic spine findings: Thoracic vertebral body height and alignment. No fracture. No spondylolys
is defect. No bone lesion. Thoracic disc height loss and endplate irregularities from Schmorl's nodes
 are measured advanced changes of degenerative disc disease. There may be some scattered shallow disc
 bulges. Bony spinal canal grossly patent. Paraspinal tissues are normal.



IMPRESSION: No acute osseous injury of the thoracic spine. See above.







CT lumbar spine findings: Lumbar vertebral body height and alignment. No fracture. No spondylolysis d
efect. No bone lesion. Paraspinal tissues are normal. Posterior disc height loss at L5-S1. There may 
be shallow disc bulges at the lower lumbar spine. Bony spinal canal grossly patent.



IMPRESSION: No acute osseous injury of the lumbar spine. Mild changes of lumbar disc disease.



Electronically signed by: Kalia Pederson MD (5/18/2022 7:49 PM) [facility]

## 2022-05-18 NOTE — PHYS DOC
Past History


Past Medical History:  Anxiety, Depression


Past Surgical History:  No Surgical History


Alcohol Use:  None


Drug Use:  Cocaine, Marijuana





General Adult


EDM:


Chief Complaint:  BACK PAIN OR INJURY





HPI:


HPI:


".. [place] had severe back pain all day.. nothing makes it better.. I ve had back 

pain before.. but it got better.. "It usually goes away with rest... but not 

this time... "





Patient is a 21 year old male who presents with above hx and complaints of back 

pain.  Patient does any trauma.  Patient denies any IV drug use.  Patient denies

any problems with defecation or urination.  Pain seems to be more midline.  Does

not radiate to one side or the other.  Patient denies any history of kidney 

stones.  Patient denies any history of recent travel or specific ill contacts.  

Patient does have a history of bronchial asthma, depression, otitis recurrent, 

anxiety, scoliosis, past cocaine and marijuana use, cyclic vomiting, acute and 

chronic cystitis, colitis, anxiety.  Patient 1 prior admission in 2018 for

suspected sepsis.  [facility] 

and depression..





Review of Systems:


Review of Systems:


Constitutional:  Denies fever or chills 


Eyes:  Denies change in visual acuity 


HENT:  Denies nasal congestion or sore throat 


Respiratory:  Denies cough or shortness of breath 


Cardiovascular:  Denies chest pain or edema 


GI:  Denies abdominal pain, nausea, vomiting, bloody stools or diarrhea 


: Denies dysuria 


Musculoskeletal: Complains of thoracic and lumbar muscle spasms


Integument:  Denies rash 


Neurologic:  Denies headache, focal weakness or sensory changes 


Endocrine:  Denies polyuria or polydipsia 


Lymphatic:  Denies swollen glands 


Psychiatric:  Denies depression or anxiety





Family History:


Family History:


Noncontributory to presentation





Current Medications:


Current Meds:


See nursing for home meds





Allergies:


Allergies:





Allergies








Coded Allergies Type Severity Reaction Last Updated Verified


 


  No Known Drug Allergies    20 No











Physical Exam:


PE:





Constitutional:reports acute distress, non-toxic appearance. []


HENT: Normocephalic, atraumatic, bilateral external ears normal, oropharynx 

moist, no oral exudates, nose normal. []


Eyes: PERRLA, EOMI, conjunctiva normal, no discharge. [] 


Neck: Normal range of motion, no tenderness, supple, no stridor. [] 


Cardiovascular:Heart rate regular rhythm, no murmur []


Lungs & Thorax:  Bilateral breath sounds clear to auscultation []


Abdomen: Bowel sounds normal, soft, no tenderness, no masses, no pulsatile 

masses. [] 


Skin: Warm, dry, no erythema, no rash. [] 


Back: Muscles spasms  tenderness, no CVA tenderness. [] 


Extremities: No tenderness, no cyanosis, no clubbing, ROM intact, no edema. [] 


Neurologic: Alert and oriented X 3, normal motor function, normal sensory 

function, no focal deficits noted.  DTRs +2 patella and brachial.   equal.


Psychologic: Affect very anxious, judgement normal, mood normal. []





EKG:


EKG:


[]





Radiology/Procedures:


Radiology/Procedures:


[facility] [address], [place] [address] [phone]


                                        


                                 IMAGING REPORT





                                     Signed





PATIENT: GURPREET WATTERS     ACCOUNT: [id number]     MRN#: [id number]


: 2000           LOCATION: ER              AGE: 21


SEX: M                    EXAM DT: 22         ACCESSION#: [id number]


STATUS: REG ER            ORD. PHYSICIAN: TIM MURILLO MD


REASON: Chest and back pain


PROCEDURE: PORTABLE CHEST 1V





XR CHEST 1V





Clinical History: Reason: Chest and back pain / Spl. Instructions:  / History: 





Technique:  AP view of the chest was obtained at 2022 7:14 PM.





Comparison: 2020.





Findings:


The cardiomediastinal silhouette is normal. The pulmonary vasculature is normal.

 The lungs and pleural margins are clear.





Impression:  





No evidence of an acute cardiopulmonary process.





Electronically signed by: Jany Mcdowell III, MD (2022 7:47 PM) [facility]














DICTATED AND SIGNED BY:     JANY MCDOWELL III, MD


DATE:     22





CC: TIM MURILLO MD; PCP,NO ~


[facility] [address], [place] [address] [phone]


                                        


                                 IMAGING REPORT





                                     Signed





PATIENT: GURPREET WATTERS     ACCOUNT: [id number]     MRN#: [id number]


: 2000           LOCATION: ER              AGE: 21


SEX: M                    EXAM DT: 22         ACCESSION#: [id number]


STATUS: REG ER            ORD. PHYSICIAN: TIM MURILLO MD


REASON: Chest and upper and lower spine pain


PROCEDURE: CT THORACIC SPINE WO CONTRAST





CT thoracic spine without contrast. CT lumbar spine without contrast.





HISTORY: Chest and upper back pain and lower back pain.





PQRS statement: CT scans at this facility use dose reduction including either 

automated exposure control, iterative reconstructions, and /or weight based 

radiation dosing via mA and kV modification when appropriate to reduce radiation

 dose to as low as reasonably achievable.








CT thoracic spine findings: Thoracic vertebral body height and alignment. No 

fracture. No spondylolysis defect. No bone lesion. Thoracic disc height loss and

 endplate irregularities from Schmorl's nodes are measured advanced changes of 

degenerative disc disease. There may be some scattered shallow disc bulges. Bony

 spinal canal grossly patent. Paraspinal tissues are normal.





IMPRESSION: No acute osseous injury of the thoracic spine. See above.











CT lumbar spine findings: Lumbar vertebral body height and alignment. No 

fracture. No spondylolysis defect. No bone lesion. Paraspinal tissues are 

normal. Posterior disc height loss at L5-S1. There may be shallow disc bulges at

 the lower lumbar spine. Bony spinal canal grossly patent.





IMPRESSION: No acute osseous injury of the lumbar spine. Mild changes of lumbar 

disc disease.





Electronically signed by: Brooks Pederson MD (2022 7:49 PM) [facility]














DICTATED AND SIGNED BY:     BROOKS PEDERSON MD


DATE:     22





CC: TIM MURILLO MD; PCP,NO ~


[[facility] [address], [place] [address]


                                 [phone]


                                        


                                 IMAGING REPORT





                                     Signed





PATIENT: GURPREET WATTERS     ACCOUNT: [id number]     MRN#: [id number]


: 2000           LOCATION: ER              AGE: 21


SEX: M                    EXAM DT: 22         ACCESSION#: [id number]


STATUS: REG ER            ORD. PHYSICIAN: TIM MURILLO MD


REASON: Chest and back pain


PROCEDURE: PORTABLE CHEST 1V





XR CHEST 1V





Clinical History: Reason: Chest and back pain / Spl. Instructions:  / History: 





Technique:  AP view of the chest was obtained at 2022 7:14 PM.





Comparison: 2020.





Findings:


The cardiomediastinal silhouette is normal. The pulmonary vasculature is normal.

 The lungs and pleural margins are clear.





Impression:  





No evidence of an acute cardiopulmonary process.





Electronically signed by: Jany Mcdowell III, MD (2022 7:47 PM) [facility]














DICTATED AND SIGNED BY:     JANY MCDOWELL III, MD


DATE:     22





CC: TIM MURILLO MD; PCP,NO ~


][facility] [address], [place] [address]


                                 [phone]


                                        


                                 IMAGING REPORT





                                     Signed





PATIENT: GURPREET WATTERS     ACCOUNT: [id number]     MRN#: [id number]


: 2000           LOCATION: ER              AGE: 21


SEX: M                    EXAM DT: 22         ACCESSION#: [id number]


STATUS: REG ER            ORD. PHYSICIAN: TIM MURILLO MD


REASON: Chest and upper and lower spine pain


PROCEDURE: CT THORACIC SPINE WO CONTRAST





CT thoracic spine without contrast. CT lumbar spine without contrast.





HISTORY: Chest and upper back pain and lower back pain.





PQRS statement: CT scans at this facility use dose reduction including either 

automated exposure control, iterative reconstructions, and /or weight based 

radiation dosing via mA and kV modification when appropriate to reduce radiation

dose to as low as reasonably achievable.








CT thoracic spine findings: Thoracic vertebral body height and alignment. No 

fracture. No spondylolysis defect. No bone lesion. Thoracic disc height loss and

endplate irregularities from Schmorl's nodes are measured advanced changes of 

degenerative disc disease. There may be some scattered shallow disc bulges. Bony

spinal canal grossly patent. Paraspinal tissues are normal.





IMPRESSION: No acute osseous injury of the thoracic spine. See above.











CT lumbar spine findings: Lumbar vertebral body height and alignment. No 

fracture. No spondylolysis defect. No bone lesion. Paraspinal tissues are 

normal. Posterior disc height loss at L5-S1. There may be shallow disc bulges at

the lower lumbar spine. Bony spinal canal grossly patent.





IMPRESSION: No acute osseous injury of the lumbar spine. Mild changes of lumbar 

disc disease.





Electronically signed by: Brooks Pederson MD (2022 7:49 PM) [facility]














DICTATED AND SIGNED BY:     BROOKS PEDERSON MD


DATE:     22





CC: TIM MURILLO MD; PCP,NO ~





Heart Score:


C/O Chest Pain:  N/A


Risk Factors:


Risk Factors:  DM, Current or recent (<one month) smoker, HTN, HLP, family 

history of CAD, obesity.


Risk Scores:


Score 0 - 3:  2.5% MACE over next 6 weeks - Discharge Home


Score 4 - 6:  20.3% MACE over next 6 weeks - Admit for Clinical Observation


Score 7 - 10:  72.7% MACE over next 6 weeks - Early Invasive Strategies





Course & Med Decision Making:


Course & Med Decision Making


Pertinent Labs and Imaging studies reviewed. (See chart for details)





Pt. reports resolution of symptoms after Toradol and Norflex.   Patient follow-

up primary care.  Patient take Tylenol and ibuprofen for pain.  May take 

Flexeril 10 mg up to 3 times a day for muscle spasms.  Must follow-up.





Impression:





1.  Back muscle spasms


2.  Anxiety





[]





Dragon Disclaimer:


Dragon Disclaimer:


This electronic medical record was generated, in whole or in part, using a voice

 recognition dictation system.





Departure


Departure:


Referrals:  


PCP,NO (PCP)


Scripts


Cyclobenzaprine Hcl (CYCLOBENZAPRINE HCL) 10 Mg Tablet


10 MG PO TID PRN for tidp, #30 TAB


   Prov: TIM MURILLO MD         22





Dragon Disclaimer


This chart was dictated in whole or in part using Voice Recognition software in 

a busy, high-work load, and often noisy Emergency Department environment.  It 

may contain unintended and wholly unrecognized errors or omissions.











TIM MURILLO MD           May 18, 2022 19:00